# Patient Record
Sex: FEMALE | Race: ASIAN
[De-identification: names, ages, dates, MRNs, and addresses within clinical notes are randomized per-mention and may not be internally consistent; named-entity substitution may affect disease eponyms.]

---

## 2018-01-01 ENCOUNTER — HOSPITAL ENCOUNTER (OUTPATIENT)
Dept: HOSPITAL 84 - D.LDO | Age: 33
Discharge: HOME | End: 2018-01-01
Attending: OBSTETRICS & GYNECOLOGY
Payer: COMMERCIAL

## 2018-01-01 VITALS — BODY MASS INDEX: 26.8 KG/M2

## 2018-01-01 DIAGNOSIS — Z3A.38: ICD-10-CM

## 2018-01-01 DIAGNOSIS — Z34.03: Primary | ICD-10-CM

## 2018-01-01 LAB
APPEARANCE UR: (no result)
BACTERIA #/AREA URNS HPF: (no result) /HPF
BILIRUB SERPL-MCNC: NEGATIVE MG/DL
COLOR UR: YELLOW
GLUCOSE SERPL-MCNC: NEGATIVE MG/DL
KETONES UR STRIP-MCNC: (no result) MG/DL
MUCOUS THREADS #/AREA URNS LPF: (no result) /LPF
NITRITE UR-MCNC: NEGATIVE MG/ML
PH UR STRIP: 6 [PH] (ref 5–6)
PROT UR-MCNC: NEGATIVE MG/DL
RBC #/AREA URNS HPF: (no result) /HPF (ref 0–5)
SP GR UR STRIP: 1.01 (ref 1–1.02)
UROBILINOGEN UR-MCNC: NORMAL MG/DL
YEAST #/AREA URNS HPF: (no result) /HPF

## 2018-01-02 ENCOUNTER — HOSPITAL ENCOUNTER (INPATIENT)
Dept: HOSPITAL 84 - D.LDO | Age: 33
LOS: 3 days | Discharge: HOME | End: 2018-01-05
Attending: OBSTETRICS & GYNECOLOGY | Admitting: OBSTETRICS & GYNECOLOGY
Payer: COMMERCIAL

## 2018-01-02 VITALS — DIASTOLIC BLOOD PRESSURE: 65 MMHG | SYSTOLIC BLOOD PRESSURE: 103 MMHG

## 2018-01-02 VITALS — DIASTOLIC BLOOD PRESSURE: 76 MMHG | SYSTOLIC BLOOD PRESSURE: 110 MMHG

## 2018-01-02 VITALS — HEIGHT: 58 IN | WEIGHT: 121 LBS | BODY MASS INDEX: 25.4 KG/M2

## 2018-01-02 VITALS — DIASTOLIC BLOOD PRESSURE: 80 MMHG | SYSTOLIC BLOOD PRESSURE: 121 MMHG
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
## 2018-01-02 DIAGNOSIS — Z3A.38: ICD-10-CM

## 2018-01-02 DIAGNOSIS — S35.532A: ICD-10-CM

## 2018-01-02 LAB
APPEARANCE UR: (no result)
BACTERIA #/AREA URNS HPF: (no result) /HPF
BILIRUB SERPL-MCNC: NEGATIVE MG/DL
COLOR UR: YELLOW
ERYTHROCYTE [DISTWIDTH] IN BLOOD BY AUTOMATED COUNT: 13.5 % (ref 11.5–14.5)
GLUCOSE SERPL-MCNC: NEGATIVE MG/DL
HCT VFR BLD CALC: 41.4 % (ref 36–48)
HGB BLD-MCNC: 14.2 G/DL (ref 12–16)
KETONES UR STRIP-MCNC: (no result) MG/DL
MCH RBC QN AUTO: 34.4 PG (ref 26–34)
MCHC RBC AUTO-ENTMCNC: 34.3 G/DL (ref 31–37)
MCV RBC: 100.2 FL (ref 80–100)
MUCOUS THREADS #/AREA URNS LPF: (no result) /LPF
NITRITE UR-MCNC: NEGATIVE MG/ML
PH UR STRIP: 6 [PH] (ref 5–6)
PLATELET # BLD: 167 10X3/UL (ref 130–400)
PMV BLD AUTO: 10.6 FL (ref 7.4–10.4)
PROT UR-MCNC: (no result) MG/DL
RBC # BLD AUTO: 4.13 10X6/UL (ref 4–5.4)
RBC #/AREA URNS HPF: (no result) /HPF (ref 0–5)
SP GR UR STRIP: 1.01 (ref 1–1.02)
SQUAMOUS #/AREA URNS HPF: (no result) /HPF (ref 0–5)
UROBILINOGEN UR-MCNC: NORMAL MG/DL
WBC # BLD AUTO: 6.5 10X3/UL (ref 4.8–10.8)
WBC #/AREA URNS HPF: (no result) /HPF (ref 0–5)

## 2018-01-02 PROCEDURE — 04QY0ZZ REPAIR LOWER ARTERY, OPEN APPROACH: ICD-10-PCS | Performed by: OBSTETRICS & GYNECOLOGY

## 2018-01-02 PROCEDURE — 10907ZC DRAINAGE OF AMNIOTIC FLUID, THERAPEUTIC FROM PRODUCTS OF CONCEPTION, VIA NATURAL OR ARTIFICIAL OPENING: ICD-10-PCS | Performed by: OBSTETRICS & GYNECOLOGY

## 2018-01-03 VITALS — DIASTOLIC BLOOD PRESSURE: 65 MMHG | SYSTOLIC BLOOD PRESSURE: 105 MMHG

## 2018-01-03 VITALS — DIASTOLIC BLOOD PRESSURE: 69 MMHG | SYSTOLIC BLOOD PRESSURE: 104 MMHG

## 2018-01-03 VITALS — DIASTOLIC BLOOD PRESSURE: 65 MMHG | SYSTOLIC BLOOD PRESSURE: 100 MMHG

## 2018-01-03 VITALS — DIASTOLIC BLOOD PRESSURE: 59 MMHG | SYSTOLIC BLOOD PRESSURE: 100 MMHG

## 2018-01-03 LAB
BASOPHILS NFR BLD AUTO: 0 % (ref 0–2)
BASOPHILS NFR BLD AUTO: 0 % (ref 0–2)
EOSINOPHIL NFR BLD: 0 % (ref 0–7)
EOSINOPHIL NFR BLD: 0 % (ref 0–7)
ERYTHROCYTE [DISTWIDTH] IN BLOOD BY AUTOMATED COUNT: 13.8 % (ref 11.5–14.5)
ERYTHROCYTE [DISTWIDTH] IN BLOOD BY AUTOMATED COUNT: 13.8 % (ref 11.5–14.5)
HCT VFR BLD CALC: 30.8 % (ref 36–48)
HCT VFR BLD CALC: 31.2 % (ref 36–48)
HGB BLD-MCNC: 10.5 G/DL (ref 12–16)
HGB BLD-MCNC: 10.6 G/DL (ref 12–16)
IMM GRANULOCYTES NFR BLD: 0.2 % (ref 0–5)
IMM GRANULOCYTES NFR BLD: 0.3 % (ref 0–5)
LYMPHOCYTES NFR BLD AUTO: 6.5 % (ref 15–50)
LYMPHOCYTES NFR BLD AUTO: 7.8 % (ref 15–50)
MCH RBC QN AUTO: 33.8 PG (ref 26–34)
MCH RBC QN AUTO: 34.1 PG (ref 26–34)
MCHC RBC AUTO-ENTMCNC: 34 G/DL (ref 31–37)
MCHC RBC AUTO-ENTMCNC: 34.1 G/DL (ref 31–37)
MCV RBC: 100 FL (ref 80–100)
MCV RBC: 99.4 FL (ref 80–100)
MONOCYTES NFR BLD: 5.8 % (ref 2–11)
MONOCYTES NFR BLD: 5.9 % (ref 2–11)
NEUTROPHILS NFR BLD AUTO: 86.1 % (ref 40–80)
NEUTROPHILS NFR BLD AUTO: 87.4 % (ref 40–80)
PLATELET # BLD: 129 10X3/UL (ref 130–400)
PLATELET # BLD: 137 10X3/UL (ref 130–400)
PMV BLD AUTO: 10.1 FL (ref 7.4–10.4)
PMV BLD AUTO: 9.6 FL (ref 7.4–10.4)
RBC # BLD AUTO: 3.08 10X6/UL (ref 4–5.4)
RBC # BLD AUTO: 3.14 10X6/UL (ref 4–5.4)
WBC # BLD AUTO: 13 10X3/UL (ref 4.8–10.8)
WBC # BLD AUTO: 13.6 10X3/UL (ref 4.8–10.8)

## 2018-01-04 VITALS — SYSTOLIC BLOOD PRESSURE: 92 MMHG | DIASTOLIC BLOOD PRESSURE: 50 MMHG

## 2018-01-04 VITALS — DIASTOLIC BLOOD PRESSURE: 55 MMHG | SYSTOLIC BLOOD PRESSURE: 97 MMHG

## 2018-01-04 VITALS — SYSTOLIC BLOOD PRESSURE: 100 MMHG | DIASTOLIC BLOOD PRESSURE: 52 MMHG

## 2018-01-04 VITALS — SYSTOLIC BLOOD PRESSURE: 92 MMHG | DIASTOLIC BLOOD PRESSURE: 54 MMHG

## 2018-01-05 VITALS — SYSTOLIC BLOOD PRESSURE: 97 MMHG | DIASTOLIC BLOOD PRESSURE: 60 MMHG

## 2018-01-05 VITALS — SYSTOLIC BLOOD PRESSURE: 100 MMHG | DIASTOLIC BLOOD PRESSURE: 59 MMHG

## 2018-01-05 VITALS — SYSTOLIC BLOOD PRESSURE: 106 MMHG | DIASTOLIC BLOOD PRESSURE: 64 MMHG

## 2018-01-11 ENCOUNTER — HOSPITAL ENCOUNTER (EMERGENCY)
Dept: HOSPITAL 84 - D.ER | Age: 33
Discharge: HOME | End: 2018-01-11
Payer: COMMERCIAL

## 2018-01-11 VITALS — BODY MASS INDEX: 25.3 KG/M2

## 2018-01-11 DIAGNOSIS — R07.89: Primary | ICD-10-CM

## 2019-08-13 ENCOUNTER — HOSPITAL ENCOUNTER (OUTPATIENT)
Dept: HOSPITAL 84 - D.LDO | Age: 34
Discharge: HOME | End: 2019-08-13
Attending: OBSTETRICS & GYNECOLOGY
Payer: COMMERCIAL

## 2019-08-13 VITALS — BODY MASS INDEX: 25.3 KG/M2

## 2019-08-13 DIAGNOSIS — Z3A.00: ICD-10-CM

## 2019-08-13 DIAGNOSIS — O26.899: Primary | ICD-10-CM

## 2019-08-20 ENCOUNTER — HOSPITAL ENCOUNTER (INPATIENT)
Dept: HOSPITAL 84 - D.LABREF | Age: 34
LOS: 2 days | Discharge: HOME | End: 2019-08-22
Attending: OBSTETRICS & GYNECOLOGY | Admitting: OBSTETRICS & GYNECOLOGY
Payer: COMMERCIAL

## 2019-08-20 VITALS — SYSTOLIC BLOOD PRESSURE: 116 MMHG | DIASTOLIC BLOOD PRESSURE: 63 MMHG

## 2019-08-20 VITALS — BODY MASS INDEX: 24.98 KG/M2 | BODY MASS INDEX: 24.98 KG/M2 | WEIGHT: 119 LBS | HEIGHT: 58 IN

## 2019-08-20 VITALS — SYSTOLIC BLOOD PRESSURE: 114 MMHG | DIASTOLIC BLOOD PRESSURE: 62 MMHG

## 2019-08-20 VITALS — DIASTOLIC BLOOD PRESSURE: 67 MMHG | SYSTOLIC BLOOD PRESSURE: 124 MMHG

## 2019-08-20 VITALS — SYSTOLIC BLOOD PRESSURE: 120 MMHG | DIASTOLIC BLOOD PRESSURE: 69 MMHG

## 2019-08-20 VITALS — SYSTOLIC BLOOD PRESSURE: 123 MMHG | DIASTOLIC BLOOD PRESSURE: 80 MMHG

## 2019-08-20 VITALS — DIASTOLIC BLOOD PRESSURE: 65 MMHG | SYSTOLIC BLOOD PRESSURE: 118 MMHG

## 2019-08-20 VITALS — DIASTOLIC BLOOD PRESSURE: 64 MMHG | SYSTOLIC BLOOD PRESSURE: 116 MMHG

## 2019-08-20 VITALS — SYSTOLIC BLOOD PRESSURE: 107 MMHG | DIASTOLIC BLOOD PRESSURE: 70 MMHG

## 2019-08-20 VITALS — SYSTOLIC BLOOD PRESSURE: 117 MMHG | DIASTOLIC BLOOD PRESSURE: 74 MMHG

## 2019-08-20 DIAGNOSIS — Z3A.37: ICD-10-CM

## 2019-08-20 DIAGNOSIS — O34.211: Primary | ICD-10-CM

## 2019-08-20 DIAGNOSIS — Z30.2: ICD-10-CM

## 2019-08-20 DIAGNOSIS — Z30.09: ICD-10-CM

## 2019-08-20 LAB
BASOPHILS NFR BLD AUTO: 0 % (ref 0–2)
EOSINOPHIL NFR BLD: 0.1 % (ref 0–7)
ERYTHROCYTE [DISTWIDTH] IN BLOOD BY AUTOMATED COUNT: 13.2 % (ref 11.5–14.5)
ERYTHROCYTE [DISTWIDTH] IN BLOOD BY AUTOMATED COUNT: 13.2 % (ref 11.5–14.5)
HCT VFR BLD CALC: 38.8 % (ref 36–48)
HCT VFR BLD CALC: 39.6 % (ref 36–48)
HGB BLD-MCNC: 13.4 G/DL (ref 12–16)
HGB BLD-MCNC: 13.8 G/DL (ref 12–16)
IMM GRANULOCYTES NFR BLD: 0.5 % (ref 0–5)
LYMPHOCYTES NFR BLD AUTO: 11.8 % (ref 15–50)
MCH RBC QN AUTO: 33.4 PG (ref 26–34)
MCH RBC QN AUTO: 33.5 PG (ref 26–34)
MCHC RBC AUTO-ENTMCNC: 34.5 G/DL (ref 31–37)
MCHC RBC AUTO-ENTMCNC: 34.8 G/DL (ref 31–37)
MCV RBC: 96.1 FL (ref 80–100)
MCV RBC: 96.8 FL (ref 80–100)
MONOCYTES NFR BLD: 4.6 % (ref 2–11)
NEUTROPHILS NFR BLD AUTO: 83 % (ref 40–80)
PLATELET # BLD: 138 10X3/UL (ref 130–400)
PLATELET # BLD: 156 10X3/UL (ref 130–400)
PMV BLD AUTO: 10.1 FL (ref 7.4–10.4)
PMV BLD AUTO: 10.3 FL (ref 7.4–10.4)
RBC # BLD AUTO: 4.01 10X6/UL (ref 4–5.4)
RBC # BLD AUTO: 4.12 10X6/UL (ref 4–5.4)
WBC # BLD AUTO: 12.7 10X3/UL (ref 4.8–10.8)
WBC # BLD AUTO: 6.7 10X3/UL (ref 4.8–10.8)

## 2019-08-20 PROCEDURE — 0UB70ZZ EXCISION OF BILATERAL FALLOPIAN TUBES, OPEN APPROACH: ICD-10-PCS | Performed by: OBSTETRICS & GYNECOLOGY

## 2019-08-21 VITALS — DIASTOLIC BLOOD PRESSURE: 70 MMHG | SYSTOLIC BLOOD PRESSURE: 102 MMHG

## 2019-08-21 VITALS — DIASTOLIC BLOOD PRESSURE: 68 MMHG | SYSTOLIC BLOOD PRESSURE: 95 MMHG

## 2019-08-21 VITALS — DIASTOLIC BLOOD PRESSURE: 64 MMHG | SYSTOLIC BLOOD PRESSURE: 107 MMHG

## 2019-08-21 VITALS — SYSTOLIC BLOOD PRESSURE: 100 MMHG | DIASTOLIC BLOOD PRESSURE: 67 MMHG

## 2019-08-21 LAB
BASOPHILS NFR BLD AUTO: 0.1 % (ref 0–2)
EOSINOPHIL NFR BLD: 0.1 % (ref 0–7)
ERYTHROCYTE [DISTWIDTH] IN BLOOD BY AUTOMATED COUNT: 13.2 % (ref 11.5–14.5)
HCT VFR BLD CALC: 34.6 % (ref 36–48)
HGB BLD-MCNC: 11.6 G/DL (ref 12–16)
IMM GRANULOCYTES NFR BLD: 0.3 % (ref 0–5)
LYMPHOCYTES NFR BLD AUTO: 10.7 % (ref 15–50)
MCH RBC QN AUTO: 32.2 PG (ref 26–34)
MCHC RBC AUTO-ENTMCNC: 33.5 G/DL (ref 31–37)
MCV RBC: 96.1 FL (ref 80–100)
MONOCYTES NFR BLD: 5.3 % (ref 2–11)
NEUTROPHILS NFR BLD AUTO: 83.5 % (ref 40–80)
PLATELET # BLD: 127 10X3/UL (ref 130–400)
PMV BLD AUTO: 10.2 FL (ref 7.4–10.4)
RBC # BLD AUTO: 3.6 10X6/UL (ref 4–5.4)
WBC # BLD AUTO: 10.7 10X3/UL (ref 4.8–10.8)

## 2019-08-22 VITALS — SYSTOLIC BLOOD PRESSURE: 92 MMHG | DIASTOLIC BLOOD PRESSURE: 61 MMHG

## 2019-08-22 VITALS — DIASTOLIC BLOOD PRESSURE: 48 MMHG | SYSTOLIC BLOOD PRESSURE: 82 MMHG

## 2019-09-03 NOTE — OP
PATIENT NAME:  BASIM HEATON                                MEDICAL RECORD: [id number]
:81                                             LOCATION:[id number]
                                                         ADMISSION DATE:19
SURGEON:  ZEKE MANDEL MD        
 
 
DATE OF OPERATION:  2019
 
PREOPERATIVE DIAGNOSES:
1.  Term intrauterine pregnancy at 37 weeks.
2.  Labor.
3.  History of previous  section times 2.
 
POSTOPERATIVE DIAGNOSES:
1.  Term intrauterine pregnancy at 37 weeks.
2.  Labor.
3.  History of previous  section times 2.
 
PROCEDURE:  A repeat low transverse  section and tubal ligation via
modified Uchida procedure as well as lysis of adhesions.
 
SURGEON:  Zeke Mandel MD
 
ANESTHESIA:  Regional via spinal.
 
INTRAVENOUS FLUIDS:  Per anesthesia record.
 
ESTIMATED BLOOD LOSS:  1000 cc.
 
SPECIMENS:  Placenta and cord for gases.
 
FINDINGS:
1.  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
anterior uterus.
2.  Viable infant, Apgars 9 at 1 and 9 at 5.
3.  Placenta delivered manually intact, 3-vessel cord noted.
3.  Normal adnexa bilaterally.
 
PROCEDURE IN DETAIL:  The patient was taken to the operating room where regional
anesthesia was achieved without difficulty.  The patient was then prepped and
draped in normal sterile fashion in the dorsal supine position.  SCDs were on
and functioning normally.  A Chambers catheter had been placed and was draining
freely.  A Pfannenstiel skin incision was made and extended downward to the
underlying subcutaneous fat to level of the fascia.  The fascia was then excised
in midline with scalpel and extended bilaterally using the Haney scissors.  The
superior and inferior aspect of the fascial incision were then grasped with
Kocher clamps times 2, tented upward, and sharply dissected from the underlying
rectus muscle using the Bovie cautery and the Haney scissors.  Rectus muscles
were then  bluntly at the top in the midline and the peritoneum was
entered sharply using the Metzenbaum scissors.  Very minimal clear space was
noted as the uterus was densely adherent to the overlying peritoneum and
posterior surface of the rectus abdominis fascia.  Careful dissection was
performed and the bladder was identified and using the Metzenbaum scissors until
the abdominal wall was completely  from the uterus.  A bladder flap was
created by excising the anterior leaf of the broad ligament across the lower
uterine segment.  A low transverse incision was made and extended via the Pelosi
method.  The fetal vertex was delivered atraumatically followed by the body. 
The infant was bulb suctioned upon delivery.  Cord was clamped times 2, cut, and
 
 
 
OPERATIVE REPORT                               [id number]    BASIM HEATON              
 
 
the infant was handed to the awaiting nursery team.  Cord was obtained for
gases.  The placenta was removed manually and intact, a 3-vessel cord was noted.
 The uterus was exteriorized, cleared of all clots and debris, and vigorously
massaged.  A good uterine tone was noted.  Uterine incision was repaired with 0
Vicryl in a running locked fashion times 2 with good hemostasis noted. 
Posterior cul-de-sac was then thoroughly irrigated.  Attention was then turned
to the fallopian tubes in which the mid portion was grabbed with a Brighton clamp
in the midline.  A defect was made with the Bovie cautery and the mesosalpinx
and curved Ruth clamps were placed across the midsection of the tube.  This
clamp section was then excised using Metzenbaum scissors and the tubal stumps
were oversewn using 2-0 Vicryl times 2.  Good hemostasis was noted from both
tubal ligation sites and the uterus was returned to the pelvis.  The anterior
cul-de-sac was thoroughly irrigated and the anterior cul-de-sac found to be
hemostatic.  The counts were correct times 2 for needles, sponges, and
instruments.  The fascia was then repaired with 0 loop PDS and the skin repaired
in a running subcutaneous fashion using 3-0 Monocryl and Dermabond.
 
TRANSINT:[id number] Voice Confirmation ID: [id number] DOCUMENT ID: [id number]
                                           
                                           ZEKE MANDEL MD        
 
 
 
Electronically Signed by ZEKE MANDEL on 19 at 1058
 
 
 
 
 
 
 
 
 
 
 
 
 
 
 
 
 
 
 
 
 
 
 
CC:                                                             [id number]
DICTATION DATE: 19 144     :     19 175      DIS IN  
                                                                      19
[facility]                                          
[address] [id number]